# Patient Record
Sex: MALE | Race: WHITE | URBAN - METROPOLITAN AREA
[De-identification: names, ages, dates, MRNs, and addresses within clinical notes are randomized per-mention and may not be internally consistent; named-entity substitution may affect disease eponyms.]

---

## 2019-06-24 ENCOUNTER — NEW PATIENT (OUTPATIENT)
Dept: URBAN - METROPOLITAN AREA CLINIC 30 | Facility: CLINIC | Age: 44
End: 2019-06-24
Payer: COMMERCIAL

## 2019-06-24 DIAGNOSIS — R42 VERTIGO: Primary | ICD-10-CM

## 2019-06-24 DIAGNOSIS — H43.393 OTHER VITREOUS OPACITIES, BILATERAL: ICD-10-CM

## 2019-06-24 DIAGNOSIS — H04.123 TEAR FILM INSUFFICIENCY OF BILATERAL LACRIMAL GLANDS: ICD-10-CM

## 2019-06-24 PROCEDURE — 92134 CPTRZ OPH DX IMG PST SGM RTA: CPT | Performed by: OPTOMETRIST

## 2019-06-24 PROCEDURE — 92004 COMPRE OPH EXAM NEW PT 1/>: CPT | Performed by: OPTOMETRIST

## 2019-06-24 ASSESSMENT — KERATOMETRY
OD: 44.97
OS: 45.59

## 2019-06-24 ASSESSMENT — VISUAL ACUITY
OS: 20/20
OD: 20/25

## 2019-06-24 ASSESSMENT — INTRAOCULAR PRESSURE
OS: 15
OD: 14

## 2022-11-16 ENCOUNTER — OFFICE VISIT (OUTPATIENT)
Dept: URBAN - METROPOLITAN AREA CLINIC 30 | Facility: CLINIC | Age: 47
End: 2022-11-16
Payer: COMMERCIAL

## 2022-11-16 DIAGNOSIS — H52.4 PRESBYOPIA: ICD-10-CM

## 2022-11-16 DIAGNOSIS — H04.123 TEAR FILM INSUFFICIENCY OF BILATERAL LACRIMAL GLANDS: Primary | ICD-10-CM

## 2022-11-16 DIAGNOSIS — G60.8 OTHER HEREDITARY AND IDIOPATHIC NEUROPATHIES: ICD-10-CM

## 2022-11-16 DIAGNOSIS — H43.393 OTHER VITREOUS OPACITIES, BILATERAL: ICD-10-CM

## 2022-11-16 DIAGNOSIS — R42 VERTIGO: ICD-10-CM

## 2022-11-16 PROCEDURE — 92004 COMPRE OPH EXAM NEW PT 1/>: CPT | Performed by: OPTOMETRIST

## 2022-11-16 ASSESSMENT — INTRAOCULAR PRESSURE
OS: 16
OD: 16

## 2022-11-16 ASSESSMENT — KERATOMETRY
OD: 44.85
OS: 45.16

## 2022-11-16 ASSESSMENT — VISUAL ACUITY
OD: 20/30
OS: 20/25

## 2022-11-16 NOTE — IMPRESSION/PLAN
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 Plan: Small fiber neuropathy per pt history. Possibly impacting BCVA. Repeat refraction and 30-2VF next visit.

## 2022-11-16 NOTE — IMPRESSION/PLAN
Impression: Tear film insufficiency of bilateral lacrimal glands Plan: Pt is not using ATs/gtts at this time. Rec ATs QAM up to QID OU.

## 2022-11-16 NOTE — IMPRESSION/PLAN
Impression: Other vitreous opacities, bilateral Plan: Retinas flat and attached OU. Reviewed signs and symptoms of RD and to call asap if occurs.

## 2022-11-16 NOTE — IMPRESSION/PLAN
Impression: Vertigo Plan: Per 6/2019 visit- Pt has had extensive testing done, MRI/MRA and inner ear testing all WNL per pt. Monitored by Neurology. f/u as scheduled.  Has also been seen by PT.

## 2022-11-16 NOTE — IMPRESSION/PLAN
Impression: Presbyopia: H52.4. Plan: Using OTC readers +1.75. Cyclo refraction in office today by Dr. Leighann Reynoso, [address] Repeat in a few weeks. Can increase to +2.00 readers in meantime. Discussed using +1.00, +1.25 at computer.